# Patient Record
Sex: MALE | Race: WHITE | NOT HISPANIC OR LATINO | Employment: OTHER | URBAN - NONMETROPOLITAN AREA
[De-identification: names, ages, dates, MRNs, and addresses within clinical notes are randomized per-mention and may not be internally consistent; named-entity substitution may affect disease eponyms.]

---

## 2018-04-05 ENCOUNTER — TELEPHONE (OUTPATIENT)
Dept: UROLOGY | Facility: CLINIC | Age: 63
End: 2018-04-05

## 2018-04-05 NOTE — TELEPHONE ENCOUNTER
Called pt to get PCP information.  His PCP is Harjeet Britton # [phone].  Called Dr Britton office and left message for medical records to fax us the records.

## 2018-04-06 ENCOUNTER — TELEPHONE (OUTPATIENT)
Dept: UROLOGY | Facility: CLINIC | Age: 63
End: 2018-04-06

## 2018-04-06 NOTE — TELEPHONE ENCOUNTER
Alicia returned your call said to fax her a letter head over to her at [phone] of what information you need.

## 2018-04-10 NOTE — PROGRESS NOTES
Mr. Ziegler is 63 y.o. male    CHIEF COMPLAINT: I am here for enlarged prostate.  My PSA is 0.811    HPI  Lower Urinary tract symptoms  Patient is here for lower urinary tract symptoms. The patient is bothered by slow stream, hesitancy, and some spraying of stream, but is without dysuria, hematuria.  Onset has been unknown.  The patient perceives the voided amount is Symptoms have been present for 10 years.  Severity is described as Moderate.  Course has been unchanged. The patient perceives the amount voided is normal for the urge. Previous  history includes diagnosis of BPH and diagnosis urethral stricture.  Previous treatment includesdietary changes with caffeine, alcohol and spicy food reduction  .          The following portions of the patient's history were reviewed and updated as appropriate: allergies, current medications, past family history, past medical history, past social history, past surgical history and problem list.      Review of Systems   Constitutional: Negative for appetite change and fever.   HENT: Negative for hearing loss and sore throat.    Eyes: Negative for pain and redness.   Respiratory: Negative for cough and shortness of breath.    Cardiovascular: Negative for chest pain and leg swelling.   Gastrointestinal: Negative for anal bleeding, nausea and vomiting.   Endocrine: Negative for cold intolerance and heat intolerance.   Genitourinary: Negative for difficulty urinating, dysuria, flank pain, frequency, hematuria and urgency.   Musculoskeletal: Negative for joint swelling and myalgias.   Skin: Negative for color change and rash.   Allergic/Immunologic: Negative for food allergies and immunocompromised state.   Neurological: Negative for dizziness and speech difficulty.   Hematological: Negative for adenopathy. Does not bruise/bleed easily.   Psychiatric/Behavioral: Negative for dysphoric mood and suicidal ideas.           Current Outpatient Prescriptions:   •  amLODIPine-benazepril  "(LOTREL 2.5-10) 2.5-10 MG per capsule, Take 1 capsule by mouth Daily., Disp: , Rfl:   •  atorvastatin (LIPITOR) 20 MG tablet, Take 20 mg by mouth Daily., Disp: , Rfl:   •  budesonide (PULMICORT) 0.5 MG/2ML nebulizer solution, Take 0.5 mg by nebulization Daily., Disp: , Rfl:   •  carvedilol CR (COREG CR) 40 MG 24 hr capsule, Take 40 mg by mouth Daily., Disp: , Rfl:   •  Dapagliflozin Propanediol (FARXIGA) 10 MG tablet, Take  by mouth., Disp: , Rfl:   •  fluticasone (FLONASE) 50 MCG/ACT nasal spray, 2 sprays into each nostril Daily., Disp: , Rfl:   •  folic acid (FOLVITE) 1 MG tablet, Take 1 mg by mouth Daily., Disp: , Rfl:   •  hydrALAZINE (APRESOLINE) 25 MG tablet, Take 25 mg by mouth 3 (Three) Times a Day., Disp: , Rfl:   •  Insulin Glargine (BASAGLAR KWIKPEN) 100 UNIT/ML injection pen, Inject  under the skin., Disp: , Rfl:   •  metFORMIN (GLUCOPHAGE) 1000 MG tablet, Take 1,000 mg by mouth 2 (Two) Times a Day With Meals., Disp: , Rfl:   •  montelukast (SINGULAIR) 10 MG tablet, Take 10 mg by mouth Every Night., Disp: , Rfl:   •  omeprazole (PRILOSEC) 40 MG capsule, Take 40 mg by mouth Daily., Disp: , Rfl:   •  BREO ELLIPTA 200-25 MCG/INH aerosol powder , , Disp: , Rfl:     Past Medical History:   Diagnosis Date   • Asthma    • GERD (gastroesophageal reflux disease)    • Sleep apnea        Past Surgical History:   Procedure Laterality Date   • BACK SURGERY     • BLADDER SURGERY     • HEMORRHOIDECTOMY     • KIDNEY STONE SURGERY     • SINUS SURGERY         Social History     Social History   • Marital status:      Social History Main Topics   • Drug use: Unknown     Other Topics Concern   • Not on file       No family history on file.      /72   Temp 97.6 °F (36.4 °C)   Ht 180.3 cm (71\")   Wt 97.1 kg (214 lb)   BMI 29.85 kg/m²       Physical Exam  Constitutional: Well nourished, Well developed; No apparent distress  Psychiatric: Appropriate affect; Alert and oriented  Eyes: " Unremarkable  Musculoskeletal: Normal gait and station  GI: Abdomen is soft, non-tender  Respiratory: No distress; Unlabored movement; No accessory musculature needed with symmetric movements  Skin: No pallor or diaphoresis  ; Penis and testicles are normal; Prostate 15-20 mL without nodule. Mild urethral narrowing with post operative meatotomy changes evident.           Data  Results for orders placed or performed in visit on 04/11/18   POC Urinalysis Dipstick, Automated   Result Value Ref Range    Color Yellow Yellow, Straw, Dark Yellow, Marlene    Clarity, UA Clear Clear    Glucose,  mg/dL (A) Negative, 1000 mg/dL (3+) mg/dL    Bilirubin Negative Negative    Ketones, UA Negative Negative    Specific Gravity  1.025 1.005 - 1.030    Blood, UA Negative Negative    pH, Urine 7.0 5.0 - 8.0    Protein, POC Negative Negative mg/dL    Urobilinogen, UA Normal Normal    Leukocytes Negative Negative    Nitrite, UA Negative Negative         Imaging Results (last 7 days)     ** No results found for the last 168 hours. **            Assessment and Plan  Diagnoses and all orders for this visit:    BPH with obstruction/lower urinary tract symptoms  -     POC Urinalysis Dipstick, Automated  -     PSA DIAGNOSTIC; Future    Acquired urinary meatal stenosis    It is explained the benign prostatic hyperplasia is a chronic urologic condition.  His voiding symptoms are stable on his current regimen. He has remained infection free since his last visit. He has no evidence of progression. We discussed symptoms that would be worrisome of either of these. We talked about the importance of being seen if he develops gross hematuria, burning with urination, or episodes that may be worrisome for inability to empty the bladder. We talked about medications that may increase the risk of urinary retention especially over the counter decongestants. This chronic issue appears stable overall. We will continue to monitor this with history, exam,  "urinalysis. Any suggestion of progression will require further work-up.     Has undergone meatotomy in the past by another Urologist. While has concern about a \"flap\" of tissue around the meatus, it doesn't appear obstructive. He said it causes spraying. I am concerned excision of this could lead to a recurrence of meatal stenosis. He agrees.           Dameon Malik MD  04/12/18  8:04 PM      Cc: Harjeet Britton MD  "

## 2018-04-11 ENCOUNTER — OFFICE VISIT (OUTPATIENT)
Dept: UROLOGY | Facility: CLINIC | Age: 63
End: 2018-04-11

## 2018-04-11 VITALS
BODY MASS INDEX: 29.96 KG/M2 | WEIGHT: 214 LBS | TEMPERATURE: 97.6 F | DIASTOLIC BLOOD PRESSURE: 72 MMHG | SYSTOLIC BLOOD PRESSURE: 138 MMHG | HEIGHT: 71 IN

## 2018-04-11 DIAGNOSIS — IMO0002 ACQUIRED URINARY MEATAL STENOSIS: ICD-10-CM

## 2018-04-11 DIAGNOSIS — N13.8 BPH WITH OBSTRUCTION/LOWER URINARY TRACT SYMPTOMS: Primary | ICD-10-CM

## 2018-04-11 DIAGNOSIS — N40.1 BPH WITH OBSTRUCTION/LOWER URINARY TRACT SYMPTOMS: Primary | ICD-10-CM

## 2018-04-11 LAB
BILIRUB BLD-MCNC: NEGATIVE MG/DL
CLARITY, POC: CLEAR
COLOR UR: YELLOW
GLUCOSE UR STRIP-MCNC: ABNORMAL MG/DL
KETONES UR QL: NEGATIVE
LEUKOCYTE EST, POC: NEGATIVE
NITRITE UR-MCNC: NEGATIVE MG/ML
PH UR: 7 [PH] (ref 5–8)
PROT UR STRIP-MCNC: NEGATIVE MG/DL
RBC # UR STRIP: NEGATIVE /UL
SP GR UR: 1.02 (ref 1–1.03)
UROBILINOGEN UR QL: NORMAL

## 2018-04-11 PROCEDURE — 99202 OFFICE O/P NEW SF 15 MIN: CPT | Performed by: UROLOGY

## 2018-04-11 PROCEDURE — 81003 URINALYSIS AUTO W/O SCOPE: CPT | Performed by: UROLOGY

## 2018-04-11 RX ORDER — ATORVASTATIN CALCIUM 20 MG/1
20 TABLET, FILM COATED ORAL DAILY
COMMUNITY

## 2018-04-11 RX ORDER — BUDESONIDE 0.5 MG/2ML
0.5 INHALANT ORAL
COMMUNITY

## 2018-04-11 RX ORDER — AMLODIPINE BESYLATE AND BENAZEPRIL HYDROCHLORIDE 2.5; 1 MG/1; MG/1
1 CAPSULE ORAL DAILY
COMMUNITY

## 2018-04-11 RX ORDER — MONTELUKAST SODIUM 10 MG/1
10 TABLET ORAL NIGHTLY
COMMUNITY

## 2018-04-11 RX ORDER — FLUTICASONE PROPIONATE 50 MCG
2 SPRAY, SUSPENSION (ML) NASAL DAILY
COMMUNITY

## 2018-04-11 RX ORDER — OMEPRAZOLE 40 MG/1
40 CAPSULE, DELAYED RELEASE ORAL DAILY
COMMUNITY

## 2018-04-11 RX ORDER — HYDRALAZINE HYDROCHLORIDE 25 MG/1
25 TABLET, FILM COATED ORAL 3 TIMES DAILY
COMMUNITY

## 2018-04-11 RX ORDER — CARVEDILOL PHOSPHATE 40 MG/1
40 CAPSULE, EXTENDED RELEASE ORAL DAILY
COMMUNITY

## 2018-04-11 RX ORDER — FOLIC ACID 1 MG/1
1 TABLET ORAL DAILY
COMMUNITY

## 2018-04-11 RX ORDER — INSULIN GLARGINE 100 [IU]/ML
INJECTION, SOLUTION SUBCUTANEOUS
COMMUNITY

## 2018-04-11 NOTE — PATIENT INSTRUCTIONS

## 2019-04-06 ENCOUNTER — RESULTS ENCOUNTER (OUTPATIENT)
Dept: UROLOGY | Facility: CLINIC | Age: 64
End: 2019-04-06

## 2019-04-06 DIAGNOSIS — N13.8 BPH WITH OBSTRUCTION/LOWER URINARY TRACT SYMPTOMS: ICD-10-CM

## 2019-04-06 DIAGNOSIS — N40.1 BPH WITH OBSTRUCTION/LOWER URINARY TRACT SYMPTOMS: ICD-10-CM

## 2019-04-08 DIAGNOSIS — N40.1 BPH WITH OBSTRUCTION/LOWER URINARY TRACT SYMPTOMS: Primary | ICD-10-CM

## 2019-04-08 DIAGNOSIS — N13.8 BPH WITH OBSTRUCTION/LOWER URINARY TRACT SYMPTOMS: Primary | ICD-10-CM

## 2020-04-08 ENCOUNTER — RESULTS ENCOUNTER (OUTPATIENT)
Dept: UROLOGY | Facility: CLINIC | Age: 65
End: 2020-04-08

## 2020-04-08 DIAGNOSIS — N13.8 BPH WITH OBSTRUCTION/LOWER URINARY TRACT SYMPTOMS: ICD-10-CM

## 2020-04-08 DIAGNOSIS — N40.1 BPH WITH OBSTRUCTION/LOWER URINARY TRACT SYMPTOMS: ICD-10-CM
